# Patient Record
Sex: MALE
[De-identification: names, ages, dates, MRNs, and addresses within clinical notes are randomized per-mention and may not be internally consistent; named-entity substitution may affect disease eponyms.]

---

## 2018-04-28 NOTE — RAD
PORTABLE CHEST ONE VIEW:

4/28/18 at 9:53 p.m.

 

HISTORY: 

Cough with streaks of blood.

 

FINDINGS:  

The heart size is normal. There is consolidation in the right lower lung. No pneumothorax or pleural 
effusions are seen. 

 

IMPRESSION:  

Right sided pneumonia.

 

POS: [facility]

## 2018-04-29 NOTE — HP
DATE OF ADMISSION:  04/28/2018

 

PRIMARY CARE PHYSICIAN:  [facility] PCP.

 

CHIEF COMPLAINT:  Shortness of breath.

 

HISTORY OF PRESENT ILLNESS:  Patient is a very pleasant 43-year-old male with no significant past med
ical history who presents to the hospital, complaints of worsening shortness of breath and lightheade
dness.  Patient stated that for the past few days, he has been having nausea, vomiting, and decreased
 appetite.  Patient stated that he also has been having cough for the past 2-3 weeks.  Patient stated
 that his temperature at home was 100.9.  Patient states that he has been taking over-the-counter med
ications without any relief.  Patient stated that he has not been able to eat or drink very much.  To
night his symptoms worsened, worsening nausea, vomiting, and some lightheadedness which concerned him
 and so he came to the ER for further evaluations.

 

PAST MEDICAL HISTORY:  Denies any significant past medical history.

 

MEDICATIONS:  He takes none.

 

ALLERGIES:  He has no known drug allergies.

 

FAMILY HISTORY:  He has got no significant family history of heart disease or diabetes.

 

SOCIAL HISTORY:  He drinks occasionally some alcohol, denies any drugs or smoking history.

 

PAST SURGICAL HISTORY:  Denies any surgical history.

 

REVIEW OF SYSTEMS:  The following complete review of systems was all negative except for the ones men
tioned above in the HPI:  Constitutional:  Weight loss or gain, ability to conduct usual activities. 
 Skin:  Rash, itching.  Eyes:  Double vision, pain.  ENT/Mouth:  Nose bleeding, neck stiffness, pain,
 tenderness.  Cardiovascular:  Palpitations, dyspnea on exertion, orthopnea.  Respiratory:  Shortness
 of breath, wheezing, cough, hemoptysis, fever, or night sweats.  Gastrointestinal:  Poor appetite, a
bdominal pain, heartburn, nausea, vomiting, constipation, or diarrhea.  Genitourinary:  Urgency, freq
uency, dysuria, nocturia.  Musculoskeletal:  Pain, swelling.  Neurologic/Psychiatric:  Anxiety, depre
ssion.  Allergy/Immunologic:  Skin rash, bleeding tendency.

 

PHYSICAL EXAMINATION:

VITAL SIGNS:  Temperature of 99.8, however, there was one temperature of 102.4, blood pressure 109/55
, respirations are 16, 93% on room air, 92 pulse.

GENERAL:  He is awake, alert, oriented x3, does not appear in distress.

CARDIOVASCULAR:  S1, S2 present.  No murmurs, rubs, or gallops.  This patient does have reproducible 
chest pain upon pushing his right lower ribcage area.

ABDOMEN:  Soft, nontender.  Bowel sounds are present x2.

LUNGS:  Clear to auscultation.  No rhonchi or wheezes noted.

EXTREMITIES:  No pitting edema.  Pedal pulses are present x2.

 

LABORATORY DATA:  Are as the following, he has a CBC indicates count of 12.3, hemoglobin of 13.0, hem
atocrit of 38.9.  No bands are noted.  His BMP:  Sodium of 136, potassium of 3.4, chloride of 97, BUN
 of 14, creatinine 1.06 and his UA had indicated high-protein and trace leukocytes.  Chest x-ray appe
ars to have his current right-sided pneumonia.

 

ASSESSMENT AND PLAN:  Patient is a very pleasant 43-year-old male who comes to the hospital with mult
iple complaints.

1.  Right-sided pneumonia.  We will start patient on antibiotics for community-acquired pneumonia, Le
vaquin.  We will start patient on some DuoNebs.  We will give patient's some cough suppressant.  Sheri
ent did state that he took 3 days of amoxicillin when he was at home, which did not really help him v
abbey much.  We will check Strep pneumo urine antigen and Legionella urine antigen.  Continue to monito
r.

2.  Sepsis, most likely secondary to problem #1.  We will start the patient on gentle hydration.  We 
will give a normal saline 1-liter bolus and continue to monitor.

3.  Mildly elevated leukocytosis, most likely secondary to his #1 problem.

4.  Deep venous thrombosis prophylaxis.  We will put patient on subcutaneous heparin.

## 2018-04-29 NOTE — PDOC.PN
- Subjective


Encounter Start Date: 04/29/18


Encounter Start Time: 11:06





Patient seen and examined, states he feels better but still has some pain in 

the right mid chest region axillary line. No other issues or complaints, all 

questions answered. 





- Objective


Resuscitation Status: 


 











Resuscitation Status           FULL:Full Resuscitation














Vital Signs & Weight: 


 Vital Signs (12 hours)











  Temp Pulse Resp BP Pulse Ox


 


 04/29/18 08:00  98.0 F  76  16   98


 


 04/29/18 07:33   74  16   98


 


 04/29/18 07:00  98.0 F  76  16  121/61  98


 


 04/29/18 02:25   78  16   100


 


 04/29/18 01:13  98.4 F  80  20   99


 


 04/29/18 00:08  98.4 F  80  20  109/64  99








 Weight











Weight                         171 lb














I&O: 


 











 04/28/18 04/29/18 04/30/18





 06:59 06:59 06:59


 


Intake Total  543 


 


Output Total  750 


 


Balance  -207 











Result Diagrams: 


 04/29/18 06:41





 04/29/18 06:41





Phys Exam





- Physical Examination


Constitutional: NAD


HEENT: PERRLA, moist MMs, sclera anicteric


Neck: no nodes, no JVD, supple


Respiratory: no wheezing, no rales, no rhonchi


Cardiovascular: RRR, no significant murmur, no rub


Gastrointestinal: soft, non-tender, no distention


Musculoskeletal: no edema, pulses present


Neurological: non-focal, normal sensation





Dx/Plan


(1) Pneumonia involving right lung


Code(s): J18.9 - PNEUMONIA, UNSPECIFIED ORGANISM   Status: Acute   





(2) Acute renal failure


Status: Acute   





(3) Rhabdomyolysis


Code(s): M62.82 - RHABDOMYOLYSIS   Status: Acute   





- Plan





* renal function improving


* cont abx and IVFs for now


* repeat labs in AM


* if renal function improving and patient feeling better will likely DC patient 

in AM


* case and plan d/w nadia at [facility], in [place] he understands and agrees 

with this plan

## 2018-04-30 NOTE — RAD
PORTABLE CHEST 1 VIEW:

 

Date:  04/30/18 

Time:  0918 hours

 

HISTORY:  

Pneumonia. 

 

FINDINGS:

 

Comparison made with exam of 04/28/18. 

 

There is interval worsening of the consolidation in the right lower lung since the previous study. Th
e heart size is normal. The left lung is clear. 

 

IMPRESSION: 

Right side pneumonia. 

 

 

POS: [facility]

## 2018-04-30 NOTE — CON
DATE OF CONSULTATION:  04/30/2018

 

HISTORY:  A 43-year-old  gentleman who admitted on 04/28/2018, I was consulted 04/30/20
18 regarding right-sided pneumonia.  He speaks no English.  His wife is at the bedside and states he 
has been having cough for the last several weeks, fever and chills.  Sputum is relatively clear.  Thi
s morning he is coughing some discolored sputum.  Apparently he is a nonsmoker, no alcohol abuse, no 
tobacco abuse, no substance abuse.

 

PAST MEDICAL HISTORY:  Unremarkable for any major medical problems, diabetes, hypertension.

 

PAST SURGICAL HISTORY:  None.  

 

SOCIAL HISTORY:  He does construction work.

 

CHRONIC MEDICATIONS:  None.

 

ALLERGIES:  None.

 

FAMILY HISTORY:  Unremarkable.  No pets.

 

REVIEW OF SYSTEMS:  Ten point negative.

 

PHYSICAL EXAMINATION:

VITAL SIGNS:  Sats 97% on room air, respiration 18, temperature 99.3, pulse 85, blood pressure 120/55
.

CHEST:  Chest reveals decreased breath sounds, no wheezing or crackles.

CARDIAC:  Normal S1, S2, no gallops.

ABDOMEN:  Soft, without masses.

NEUROLOGIC:  Unremarkable.

EXTREMITIES:  No edema.

 

LABORATORY:  Blood culture, basilar species, not Anthrax, probably is a contamination.  

 

His chest x-ray shows a right mid chest pneumonia.  I do not see obvious pleural effusion. 

 

IMPRESSION:  Community-acquired pneumonia in a nonsmoker.

 

PLAN:  Levaquin should be more than adequate.  I am going to try and get a sputum culture if possible
.

 

I will follow.  If he becomes afebrile, suggest switching him over to oral antibiotics.

 

This is a 70 minute consultation, which 50% time spent in direct patient care.

## 2018-04-30 NOTE — PDOC.PN
- Subjective


Encounter Start Date: 04/30/18


Encounter Start Time: 11:40





Patient seen and examined, states he has improvement in his breathing but his 

pain is now not just right axxilary line but also epigastric, no other issues 

or complaints. Family at bedside, all questions answered. 





- Objective


Resuscitation Status: 


 











Resuscitation Status           FULL:Full Resuscitation














Vital Signs & Weight: 


 Vital Signs (12 hours)











  Temp Pulse Resp BP Pulse Ox


 


 04/30/18 10:04   85  18   97


 


 04/30/18 08:00  99.3 F  85  18   97


 


 04/30/18 07:49  99.3 F  85  20  115/55 L  95


 


 04/30/18 05:54   88  14   97


 


 04/30/18 02:11   82  16   98


 


 04/30/18 01:52      96








 Weight











Weight                         171 lb














I&O: 


 











 04/29/18 04/30/18 05/01/18





 06:59 06:59 06:59


 


Intake Total 543 3221 240


 


Output Total 750  


 


Balance -207 3221 240











Result Diagrams: 


 04/29/18 06:41





 04/29/18 06:41





Phys Exam





- Physical Examination


Constitutional: NAD


HEENT: PERRLA, moist MMs, sclera anicteric


Neck: no nodes, no JVD, supple


decreased breath sounds righ tmiddle lobe


no respiratory distress


Cardiovascular: RRR, no significant murmur, no rub


Gastrointestinal: soft, non-tender, no distention


Musculoskeletal: no edema, pulses present





Dx/Plan


(1) Pneumonia involving right lung


Code(s): J18.9 - PNEUMONIA, UNSPECIFIED ORGANISM   Status: Acute   





(2) Acute renal failure


Status: Acute   





(3) Rhabdomyolysis


Code(s): M62.82 - RHABDOMYOLYSIS   Status: Acute   





- Plan





* bacillus on culture, repeat culture


* cont levaquin


* CXR shows worsening pneumonia, will consult pulmonary for assistance


* continue current plan of care with no changes for now


* will obtain CT scan of Chest as well


* case and plan d/w patient at [facility], in [place], they understand and agree 

with this plan

## 2018-04-30 NOTE — CT
CT THORAX WITH IV CONTRAST:

 

Indication: Abnormal chest radiograph. Evaluate for pneumonia. 

 

Comparison: Prior radiograph dated 4-30-18. 

 

FINDINGS: 

There is prominent airspace consolidation seen within the right middle lobe. There is debris seen wit
hin the right middle lobe bronchi. There is an enlarged right infrahilar lymph node measuring 1.3 cm.
 There is a small right pleural effusion. There is right basilar atelectasis. There are patchy reticu
lar and ground glass opacities within the superior segment of the right lower lobe. Left lung is aditya
r. No suspicious osseous lesion is identified. 

 

IMPRESSION: 

1. Prominent consolidation in the right middle lobe suspicious for pneumonia. There is some debris pr
esent within the right middle lobe bronchus. It is difficult to exclude the presence of a right hilar
 mass due to the extent of the consolidation. No visible enhancing mass is grossly evident. There is 
an enlarged right infrahilar lymph node measuring up to 1.3 cm which may be reactive in nature. There
 is additional ground glass and reticular air opacity seen within the superior segment of the right l
ower lobe, suspicious for an additional area of infection.  Followup CT after antibiotic therapy kevin
mmended to document resolution. 

2. Small right sided effusion with right basilar atelectasis. 

3. Multiple bilateral renal cysts. 

4. Calcified granuloma within the spleen likely related to prior granulomatous disease. 

 

 

POS: [facility]

## 2018-05-01 NOTE — PDOC.PN
- Subjective


Encounter Start Date: 05/01/18


Encounter Start Time: 11:42





Patient seen and examined, continues ot have bouts of dry cough but better than 

before, no other issues or complaints. 





- Objective


Resuscitation Status: 


 











Resuscitation Status           FULL:Full Resuscitation














Vital Signs & Weight: 


 Vital Signs (12 hours)











  Temp Pulse Resp BP Pulse Ox


 


 05/01/18 09:25   80  14   96


 


 05/01/18 08:00  99.1 F  80  14   93 L


 


 05/01/18 07:37  99.1 F  79  18  123/62  93 L


 


 05/01/18 05:57   91  16   97


 


 05/01/18 01:50   87  18   96








 Weight











Weight                         171 lb














I&O: 


 











 04/30/18 05/01/18 05/02/18





 06:59 06:59 06:59


 


Intake Total 3221 660 


 


Balance 3221 660 











Result Diagrams: 


 04/29/18 06:41





 04/29/18 06:41





Phys Exam





- Physical Examination


Constitutional: NAD


HEENT: PERRLA, moist MMs, sclera anicteric


Neck: no nodes, no JVD, supple


Respiratory: no wheezing, no rales, no rhonchi


Cardiovascular: RRR, no significant murmur, no rub


Gastrointestinal: soft, non-tender, no distention


Musculoskeletal: no edema, pulses present


Neurological: non-focal, normal sensation, moves all 4 limbs





Dx/Plan


(1) Pneumonia involving right lung


Code(s): J18.9 - PNEUMONIA, UNSPECIFIED ORGANISM   Status: Acute   





(2) Acute renal failure


Status: Acute   





(3) Rhabdomyolysis


Code(s): M62.82 - RHABDOMYOLYSIS   Status: Acute   





- Plan





* cont abx


* sputum culture possibly if obtainable


* labs improving


* possible DC in AM if patient feeling well


* tessalon pearls for cough


* case and plan d/w patient in [place], he understands and agrees with cinthia servin

## 2018-05-01 NOTE — PRG
DATE OF SERVICE:  05/01/2018

 

This morning he is better, he is coughing, but no productive sputum.  

 

PHYSICAL EXAMINATION: 

VITAL SIGNS:  Sats 100% room air, temperature is still 99, pulse 88, respirations 14, blood pressure 
122/62.

CHEST:  Chest reveals decreased breath sounds right lung.

CARDIAC:  Normal S1-S2.  No gallops.

ABDOMEN:  Soft.  No masses.

 

IMPRESSION:  Right middle lobe pneumonia, small pleural effusion, community-acquired, probably Strept
ococcus pneumonia.

 

PLAN:  May continue IV antibiotics for another 24 hours.  Once he is afebrile switch him over to oral
 Levaquin.  Follow chest x-ray in a couple of days.

 

Obviously, if the effusion gets larger, may consider a thoracentesis.

## 2018-05-02 NOTE — PRG
DATE OF SERVICE:  05/02/2018

 

This morning he is awake, alert, responsive.  

 

PHYSICAL EXAMINATION: 

VITAL SIGNS:  Temperature 97, pulse is 86, respirations 16, O2 sats 93% on room air, much better.  Bl
ood pressure 152/58.  

CHEST:  No cough.  ____ negative.  Chest revealed decreased breath sounds in the right lung.  Left kathleen
ng unremarkable.

CARDIAC:  Normal S1, S2.  

ABDOMEN:  Soft, no masses.

 

IMPRESSION:

1.  Right-sided pneumonia, community acquired.  

2.   Small pleural effusion.  

 

PLAN:  He appears to have improved substantially.  I will switch him over to oral antibiotics.  X-ray
 shows resolution of the pneumonia.  Probably could be discharged home tomorrow on oral antibiotics.

## 2018-05-02 NOTE — PDOC.PN
- Subjective


Encounter Start Date: 05/02/18


Encounter Start Time: 11:00





Patient seen and examined for Sepsis/Pneumonia. Productive cough +. No 

overnight events





- Objective


Resuscitation Status: 


 











Resuscitation Status           FULL:Full Resuscitation














MAR Reviewed: Yes


Vital Signs & Weight: 


 Vital Signs (12 hours)











  Temp Pulse Resp BP Pulse Ox


 


 05/02/18 20:00  98.2 F  95  18  127/74  93 L


 


 05/02/18 19:12   73  16   96


 


 05/02/18 11:41   85  16   96








 Weight











Weight                         171 lb














I&O: 


 











 05/01/18 05/02/18 05/03/18





 06:59 06:59 06:59


 


Intake Total 660 1500 1720


 


Output Total   200


 


Balance 660 1500 1520











Result Diagrams: 


 04/29/18 06:41





 04/29/18 06:41


Radiology Reviewed by me: Yes (CT chest - Rt sided Pneumonia)





Phys Exam





- Physical Examination


Intermittent coughing spells


Respiratory: wheezing present


Rt sided rales with dec AE, Scat rhonchi


Cardiovascular: RRR, no rub


no heaves/pulsation


Gastrointestinal: soft, non-tender, no distention, positive bowel sounds


Musculoskeletal: no edema


Neurological: non-focal, moves all 4 limbs


Psychiatric: normal affect, A&O x 3





Dx/Plan





- Plan


continue antibiotics, DVT proph w/SCDs





IMPRESSION:


1.  Sepsis due to pneumonia.


2.  Pleural effusion


3.  Obesity BMI 31.3


4.  Leukocytosis due to #1


 


PLAN:


* Cont Levaquin


* Repeat CXR in AM


* DC planning








Review of Systems





- Review of Systems


Cardiovascular: negative: chest pain, palpitations, orthopnea, paroxysmal 

nocturnal dyspnea, edema, light headedness, other


Gastrointestinal: negative: Nausea, Vomiting, Abdominal Pain, Diarrhea, 

Constipation, Melena, Hematochezia, Other





- Medications/Allergies


Allergies/Adverse Reactions: 


 Allergies











Allergy/AdvReac Type Severity Reaction Status Date / Time


 


No Known Drug Allergies Allergy   Verified 04/29/18 01:12











Medications: 


 Current Medications





Acetaminophen (Tylenol)  650 mg PO Q4H PRN


   PRN Reason: Headache/Fever or Pain


   Last Admin: 05/01/18 21:26 Dose:  650 mg


Albuterol/Ipratropium (Duoneb)  3 ml NEB TID-RT MATTY


   Last Admin: 05/02/18 19:12 Dose:  3 ml


Benzonatate (Tessalon)  100 mg PO TIDPRN PRN


   PRN Reason: Cough


   Last Admin: 05/02/18 20:25 Dose:  100 mg


Guaifenesin (Robitussin Sf)  200 mg PO Q4H PRN


   PRN Reason: Cough


   Last Admin: 05/02/18 18:00 Dose:  200 mg


Heparin Sodium (Porcine) (Heparin)  5,000 units SC TID MATTY


   Last Admin: 05/02/18 20:29 Dose:  5,000 units


Levofloxacin (Levaquin)  750 mg PO 0600 MATTY


   Stop: 05/10/18 06:01


Sodium Chloride (Flush - Normal Saline)  10 ml IVF Q12HR MATTY


   Last Admin: 05/02/18 08:22 Dose:  10 ml


Sodium Chloride (Flush - Normal Saline)  10 ml IVF PRN PRN


   PRN Reason: Saline Flush


Temazepam (Restoril)  15 mg PO HS PRN


   PRN Reason: Insomnia


   Last Admin: 05/02/18 20:25 Dose:  15 mg

## 2018-05-03 NOTE — DIS
DATE OF DISCHARGE:  05/03/2018

 

DISCHARGE DISPOSITION:  Home.

 

FOLLOWUP:

1.  Follow up with [facility] in 1 week.

2.  Follow up with Dr. Guardado in 2 weeks with a chest x-ray.

 

DISCHARGE MEDICATIONS:

1.  Mucinex 600 mg twice a day for the next 10 days.

2.  Levaquin 750 mg daily for the next 5 days.

 

INPATIENT CONSULTANTS:  Pulmonary, Dr. Guardado.

 

SIGNIFICANT LABORATORY DATA:

1.  WBC on admission 12.3, repeat was 10.6.

2.  Potassium 3.4, repeat potassium 3.7.

3.  Streptococcus pneumonia and legionella urinary antigens were negative.

4.  Blood cultures 1/2 was positive for bacillus probably contaminant.

5.  Chest x-ray on admission showed right-sided pneumonia.

6.  CT scan of the chest showed prominent consolidation in the right middle lobe suspicious for pneum
onia.  There was small right-sided effusion with right basilar atelectasis with multiple bilateral re
nal cysts.

 

BRIEF HOSPITAL COURSE:  The patient is a 43-year-old male, who presented to the hospital with shortne
ss of breath, cough, and wheezing.  Please refer to the history and physical dated 04/28/2018 for fur
ther details.  His temperature in the emergency room was 101.6 with a pulse rate of 100, blood pressu
re 114/69.

 

The patient was admitted to the medical floor with a diagnosis of right-sided pneumonia.  He was star
ermias on Levaquin along with nebulizer treatment with good improvement.  The patient has been afebrile 
over the last 48 hours.  His blood cultures so far have been negative.  The patient was evaluated by 
Pulmonary, Dr. Guardado.  Dr. Guardado recommended to continue Levaquin for another 5 days.  He will follow u
p with Dr. Guardado in 2 weeks for a chest x-ray.  He will decide at that time if patient needs bronchosc
opy or not.  He has been cleared by Pulmonary for discharge.

 

FINAL DIAGNOSES:

1.  Sepsis secondary to community-acquired pneumonia.

2.  Hypokalemia, corrected.

3.  Obesity with a BMI 31.3.

4.  Dehydration on admission, resolved.

5.  Chronic anemia.  Primary care physician advised to follow.

6.  Leukocytosis secondary to pneumonia.

7.  Small right-sided pleural effusion secondary to pneumonia.

 

Plan of care was discussed with the patient in detail.  He stated understanding.

## 2018-05-03 NOTE — RAD
TWO VIEW CHEST:

 

Comparison: 4-30-18

 

Clinical history: Pneumonia. 

 

FINDINGS: 

Re-demonstration of moderate sized region of opacification at the right middle lobe. Finding demonstr
ates wedge shaped configuration compatible with lobar collapse of the right middle lobe which raises 
suspicion for a central obstructing lesion. No obvious effusion. No discrete pneumothorax. Cardiac si
lhouette is within normal limits in size. 

 

IMPRESSION: 

Right middle lobe collapse, which raises suspicion for a central obstructing lesion. Recommend follow
 up with bronchoscopy as well as continued imaging follow up. 

 

Code T

 

POS: [facility]

## 2018-05-03 NOTE — PRG
DATE OF SERVICE:  05/03/2018

 

This is a 43-year-old  gentleman who remains afebrile now for several days.

 

PHYSICAL EXAMINATION: 

VITAL SIGNS:  Sats are 96% on room air, respiration 14, pulse 69, blood pressure 127/74.

CHEST:  Chest reveals decreased breath sounds in the right lung without any wheezing or crackles.

CARDIAC:  Normal S1, S2, no gallops. 

 

X-ray today shows bilateral pneumonia.  His CAT scan showed air bronchograms suggestive more than lik
ely does not have an obstruction there.

 

IMPRESSION:  1.  Right middle lobe pneumonia, probably community-acquired Streptococcus, although all
 cultures are negative.

 

PLAN:  He can be discharged home on Levaquin for another 5 days.  He can see me in the office in 2 we
eks with a chest x-ray.  Obviously if the density persists, may consider bronchoscopy at that time.

 

He is a nonsmoker.  I see no reason at this stage to do an urgent bronchoscopy.

## 2021-04-02 ENCOUNTER — HOSPITAL ENCOUNTER (EMERGENCY)
Dept: HOSPITAL 92 - ERS | Age: 46
Discharge: HOME | End: 2021-04-02
Payer: SELF-PAY

## 2021-04-02 DIAGNOSIS — L50.9: Primary | ICD-10-CM

## 2021-04-02 PROCEDURE — 99282 EMERGENCY DEPT VISIT SF MDM: CPT

## 2022-07-20 ENCOUNTER — HOSPITAL ENCOUNTER (EMERGENCY)
Dept: HOSPITAL 92 - ERS | Age: 47
Discharge: HOME | End: 2022-07-20
Payer: COMMERCIAL

## 2022-07-20 DIAGNOSIS — S46.212A: Primary | ICD-10-CM
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
## 2022-11-29 NOTE — RAD
XR Knee Lt 4 View STANDARD



History: Pain



Comparison: None.



Findings: Moderate free patellar and pretibial soft tissue swelling. No acute fracture or malalignmen
t. No significant knee joint effusion.



Small enthesophyte quadriceps tendon insertion.





Impression: Moderate pretibial prepatellar soft tissue swelling can be seen with bursitis. No underly
ing osseous abnormality.



Reported By: Shadi Martin 

Electronically Signed:  9/18/2020 5:33 PM Scc Poorly Differentiated Histology Text: There were nests and single cells of invasive pooly differentiated atypical keratinocytes seen.   The area of positive cancer is as marked on the Mohs map.